# Patient Record
Sex: FEMALE | Race: WHITE | URBAN - METROPOLITAN AREA
[De-identification: names, ages, dates, MRNs, and addresses within clinical notes are randomized per-mention and may not be internally consistent; named-entity substitution may affect disease eponyms.]

---

## 2017-01-23 ENCOUNTER — PRE VISIT (OUTPATIENT)
Dept: ORTHOPEDICS | Facility: CLINIC | Age: 52
End: 2017-01-23

## 2017-01-23 NOTE — TELEPHONE ENCOUNTER
1.  Date/reason for appt: 2/16/17 - Right hip labral tear    2.  Referring provider: Self     3.  Call to patient (Yes / No - short description): Yes, LM to call me back.    *Where are the outside records? Where was surgery performed at? Needs to sign GORDON. **       Per encounter made my Radha Katelin:    Radha Thomason at 11/23/2016 12:05 PM      Status: Signed         Expand All Collapse All    1.  Date/reason for appt:  1/05/17-Right Hip    2.  Referring provider:  Self    3.  Call to patient (Yes / No - short description):  No, transferred from .  Pt will mail all records/imaging, including prev hip surgery 2011, to [facility].                      Radha Thomason at 12/8/2016  4:20 PM      Status: Signed         Expand All Collapse All    Reminded pt, awaiting records/imaging.....it's in the mail..                   Radha Thomason at 12/21/2016  4:16 PM      Status: Signed         Expand All Collapse All    Pt's phone not set up to take msgs.  Called daughter and left sg, please return call.  Need hip records, op report and imaging asap                   Radha Thomason at 12/28/2016 10:53 AM      Status: Signed         Expand All Collapse All    Left another message for pt to return call.  Where's she been seen recently, what's she had done; any surgeries.  Need email to send GORDON(s).

## 2017-01-26 NOTE — TELEPHONE ENCOUNTER
Spoke with Arin on the phone. Stated she mailed out her records and imaging to [facility] (Attention to Micaela). Said she spoke with the clinic and they gave her the Ortho mailing address/mail code.     Sent message to ortho to see if they've received records.

## 2017-01-26 NOTE — TELEPHONE ENCOUNTER
Per message from Micaela Ross RN Xiong, Sylvie                     Yes, those disks were received yesterday and forwarded to be loaded into system.     Thanks!     Micaela YUSUF RN

## 2017-02-03 NOTE — TELEPHONE ENCOUNTER
Micaela YUSUF(RN) called from [facility] to see if we received an GORDON from pt. No GORDON received from the pt yet, phone call was connected to the pt. Emailed her a blank GORDON form since she states she needs to do her research as to where she went for her right hip and will fax it back to us.  [email or web address]

## 2017-02-16 DIAGNOSIS — M25.551 HIP PAIN, RIGHT: Primary | ICD-10-CM
